# Patient Record
Sex: FEMALE | Race: BLACK OR AFRICAN AMERICAN | Employment: UNEMPLOYED | URBAN - METROPOLITAN AREA
[De-identification: names, ages, dates, MRNs, and addresses within clinical notes are randomized per-mention and may not be internally consistent; named-entity substitution may affect disease eponyms.]

---

## 2018-04-19 ENCOUNTER — OFFICE VISIT (OUTPATIENT)
Dept: FAMILY MEDICINE CLINIC | Facility: CLINIC | Age: 38
End: 2018-04-19

## 2018-04-19 VITALS
HEART RATE: 95 BPM | WEIGHT: 147.2 LBS | SYSTOLIC BLOOD PRESSURE: 129 MMHG | TEMPERATURE: 98.5 F | HEIGHT: 67 IN | OXYGEN SATURATION: 97 % | DIASTOLIC BLOOD PRESSURE: 89 MMHG | BODY MASS INDEX: 23.1 KG/M2 | RESPIRATION RATE: 17 BRPM

## 2018-04-19 DIAGNOSIS — H61.21 IMPACTED CERUMEN OF RIGHT EAR: ICD-10-CM

## 2018-04-19 DIAGNOSIS — R82.90 URINE FINDINGS ABNORMAL: ICD-10-CM

## 2018-04-19 DIAGNOSIS — R10.84 GENERALIZED ABDOMINAL PAIN: ICD-10-CM

## 2018-04-19 DIAGNOSIS — R10.84 GENERALIZED ABDOMINAL PAIN: Primary | ICD-10-CM

## 2018-04-19 DIAGNOSIS — F50.89 PICA IN ADULTS: ICD-10-CM

## 2018-04-19 DIAGNOSIS — Z87.19 HISTORY OF CONSTIPATION: ICD-10-CM

## 2018-04-19 LAB
BILIRUB UR QL STRIP: NEGATIVE
GLUCOSE UR-MCNC: NEGATIVE MG/DL
KETONES P FAST UR STRIP-MCNC: NEGATIVE MG/DL
PH UR STRIP: 8.5 [PH] (ref 4.6–8)
PROT UR QL STRIP: ABNORMAL
SP GR UR STRIP: 1.01 (ref 1–1.03)
UA UROBILINOGEN AMB POC: ABNORMAL (ref 0.2–1)
URINALYSIS CLARITY POC: CLEAR
URINALYSIS COLOR POC: YELLOW
URINE BLOOD POC: NEGATIVE
URINE LEUKOCYTES POC: ABNORMAL
URINE NITRITES POC: NEGATIVE

## 2018-04-19 RX ORDER — DOCUSATE SODIUM 100 MG/1
100 CAPSULE, LIQUID FILLED ORAL 2 TIMES DAILY
Qty: 60 CAP | Refills: 2 | Status: SHIPPED | OUTPATIENT
Start: 2018-04-19 | End: 2018-07-18

## 2018-04-19 NOTE — PATIENT INSTRUCTIONS
Back Stretches: Exercises  Your Care Instructions  Here are some examples of exercises for stretching your back. Start each exercise slowly. Ease off the exercise if you start to have pain. Your doctor or physical therapist will tell you when you can start these exercises and which ones will work best for you. How to do the exercises  Overhead stretch    1. Stand comfortably with your feet shoulder-width apart. 2. Looking straight ahead, raise both arms over your head and reach toward the ceiling. Do not allow your head to tilt back. 3. Hold for 15 to 30 seconds, then lower your arms to your sides. 4. Repeat 2 to 4 times. Side stretch    1. Stand comfortably with your feet shoulder-width apart. 2. Raise one arm over your head, and then lean to the other side. 3. Slide your hand down your leg as you let the weight of your arm gently stretch your side muscles. Hold for 15 to 30 seconds. 4. Repeat 2 to 4 times on each side. Press-up    1. Lie on your stomach, supporting your body with your forearms. 2. Press your elbows down into the floor to raise your upper back. As you do this, relax your stomach muscles and allow your back to arch without using your back muscles. As your press up, do not let your hips or pelvis come off the floor. 3. Hold for 15 to 30 seconds, then relax. 4. Repeat 2 to 4 times. Relax and rest    1. Lie on your back with a rolled towel under your neck and a pillow under your knees. Extend your arms comfortably to your sides. 2. Relax and breathe normally. 3. Remain in this position for about 10 minutes. 4. If you can, do this 2 or 3 times each day. Follow-up care is a key part of your treatment and safety. Be sure to make and go to all appointments, and call your doctor if you are having problems. It's also a good idea to know your test results and keep a list of the medicines you take. Where can you learn more? Go to http://sunil-karla.info/.   Enter A413 in the search box to learn more about \"Back Stretches: Exercises. \"  Current as of: March 21, 2017  Content Version: 11.4  © 1622-0240 [employer]. Care instructions adapted under license by [employer] (which disclaims liability or warranty for this information). If you have questions about a medical condition or this instruction, always ask your healthcare professional. [address] any warranty or liability for your use of this information. Abdominal Pain: Care Instructions  Your Care Instructions    Abdominal pain has many possible causes. Some aren't serious and get better on their own in a few days. Others need more testing and treatment. If your pain continues or gets worse, you need to be rechecked and may need more tests to find out what is wrong. You may need surgery to correct the problem. Don't ignore new symptoms, such as fever, nausea and vomiting, urination problems, pain that gets worse, and dizziness. These may be signs of a more serious problem. Your doctor may have recommended a follow-up visit in the next 8 to 12 hours. If you are not getting better, you may need more tests or treatment. The doctor has checked you carefully, but problems can develop later. If you notice any problems or new symptoms, get medical treatment right away. Follow-up care is a key part of your treatment and safety. Be sure to make and go to all appointments, and call your doctor if you are having problems. It's also a good idea to know your test results and keep a list of the medicines you take. How can you care for yourself at home? · Rest until you feel better. · To prevent dehydration, drink plenty of fluids, enough so that your urine is light yellow or clear like water. Choose water and other caffeine-free clear liquids until you feel better.  If you have kidney, heart, or liver disease and have to limit fluids, talk with your doctor before you increase the amount of fluids you drink. · If your stomach is upset, eat mild foods, such as rice, dry toast or crackers, bananas, and applesauce. Try eating several small meals instead of two or three large ones. · Wait until 48 hours after all symptoms have gone away before you have spicy foods, alcohol, and drinks that contain caffeine. · Do not eat foods that are high in fat. · Avoid anti-inflammatory medicines such as aspirin, ibuprofen (Advil, Motrin), and naproxen (Aleve). These can cause stomach upset. Talk to your doctor if you take daily aspirin for another health problem. When should you call for help? Call 911 anytime you think you may need emergency care. For example, call if:  ? · You passed out (lost consciousness). ? · You pass maroon or very bloody stools. ? · You vomit blood or what looks like coffee grounds. ? · You have new, severe belly pain. ?Call your doctor now or seek immediate medical care if:  ? · Your pain gets worse, especially if it becomes focused in one area of your belly. ? · You have a new or higher fever. ? · Your stools are black and look like tar, or they have streaks of blood. ? · You have unexpected vaginal bleeding. ? · You have symptoms of a urinary tract infection. These may include:  ¨ Pain when you urinate. ¨ Urinating more often than usual.  ¨ Blood in your urine. ? · You are dizzy or lightheaded, or you feel like you may faint. ? Watch closely for changes in your health, and be sure to contact your doctor if:  ? · You are not getting better after 1 day (24 hours). Where can you learn more? Go to http://sunil-karla.info/. Enter Q629 in the search box to learn more about \"Abdominal Pain: Care Instructions. \"  Current as of: March 20, 2017  Content Version: 11.4  © 6497-9799 [employer]. Care instructions adapted under license by [employer] (which disclaims liability or warranty for this information).  If you have questions about a medical condition or this instruction, always ask your healthcare professional. [address] any warranty or liability for your use of this information. Earwax Blockage: Care Instructions  Your Care Instructions    Earwax is a natural substance that protects the ear canal. Normally, earwax drains from the ears and does not cause problems. Sometimes earwax builds up and hardens. Earwax blockage (also called cerumen impaction) can cause some loss of hearing and pain. When wax is tightly packed, you will need to have your doctor remove it. Follow-up care is a key part of your treatment and safety. Be sure to make and go to all appointments, and call your doctor if you are having problems. It's also a good idea to know your test results and keep a list of the medicines you take. How can you care for yourself at home? · Do not try to remove earwax with cotton swabs, fingers, or other objects. This can make the blockage worse and damage the eardrum. · If your doctor recommends that you try to remove earwax at home:  ¨ Soften and loosen the earwax with warm mineral oil. You also can try hydrogen peroxide mixed with an equal amount of room temperature water. Place 2 drops of the fluid, warmed to body temperature, in the ear two times a day for up to 5 days. ¨ Once the wax is loose and soft, all that is usually needed to remove it from the ear canal is a gentle, warm shower. Direct the water into the ear, then tip your head to let the earwax drain out. Dry your ear thoroughly with a hair dryer set on low. Hold the dryer several inches from your ear. ¨ If the warm mineral oil and shower do not work, use an over-the-counter wax softener followed by gentle flushing with an ear syringe each night for a week or two. Make sure the flushing solution is body temperature. Cool or hot fluids in the ear can cause dizziness. When should you call for help?   Call your doctor now or seek immediate medical care if:  ? · Pus or blood drains from your ear. ? · Your ears are ringing or feel full. ? · You have a loss of hearing. ? Watch closely for changes in your health, and be sure to contact your doctor if:  ? · You have pain or reduced hearing after 1 week of home treatment. ? · You have any new symptoms, such as nausea or balance problems. Where can you learn more? Go to http://sunil-karla.info/. Enter U231 in the search box to learn more about \"Earwax Blockage: Care Instructions. \"  Current as of: March 20, 2017  Content Version: 11.4  © 6577-4527 [employer]. Care instructions adapted under license by [employer] (which disclaims liability or warranty for this information). If you have questions about a medical condition or this instruction, always ask your healthcare professional. [place] [address] any warranty or liability for your use of this information. Constipation: Care Instructions  Your Care Instructions    Constipation means that you have a hard time passing stools (bowel movements). People pass stools from 3 times a day to once every 3 days. What is normal for you may be different. Constipation may occur with pain in the rectum and cramping. The pain may get worse when you try to pass stools. Sometimes there are small amounts of bright red blood on toilet paper or the surface of stools. This is because of enlarged veins near the rectum (hemorrhoids). A few changes in your diet and lifestyle may help you avoid ongoing constipation. Your doctor may also prescribe medicine to help loosen your stool. Some medicines can cause constipation. These include pain medicines and antidepressants. Tell your doctor about all the medicines you take. Your doctor may want to make a medicine change to ease your symptoms. Follow-up care is a key part of your treatment and safety.  Be sure to make and go to all appointments, and call your doctor if you are having problems. It's also a good idea to know your test results and keep a list of the medicines you take. How can you care for yourself at home? · Drink plenty of fluids, enough so that your urine is light yellow or clear like water. If you have kidney, heart, or liver disease and have to limit fluids, talk with your doctor before you increase the amount of fluids you drink. · Include high-fiber foods in your diet each day. These include fruits, vegetables, beans, and whole grains. · Get at least 30 minutes of exercise on most days of the week. Walking is a good choice. You also may want to do other activities, such as running, swimming, cycling, or playing tennis or team sports. · Take a fiber supplement, such as Citrucel or Metamucil, every day. Read and follow all instructions on the label. · Schedule time each day for a bowel movement. A daily routine may help. Take your time having your bowel movement. · Support your feet with a small step stool when you sit on the toilet. This helps flex your hips and places your pelvis in a squatting position. · Your doctor may recommend an over-the-counter laxative to relieve your constipation. Examples are Milk of Magnesia and MiraLax. Read and follow all instructions on the label. Do not use laxatives on a long-term basis. When should you call for help? Call your doctor now or seek immediate medical care if:  ? · You have new or worse belly pain. ? · You have new or worse nausea or vomiting. ? · You have blood in your stools. ? Watch closely for changes in your health, and be sure to contact your doctor if:  ? · Your constipation is getting worse. ? · You do not get better as expected. Where can you learn more? Go to http://sunil-karla.info/. Enter [phone] in the search box to learn more about \"Constipation: Care Instructions. \"  Current as of: March 20, 2017  Content Version: 11.4  © 9055-4400 [employer]. Care instructions adapted under license by [employer] (which disclaims liability or warranty for this information). If you have questions about a medical condition or this instruction, always ask your healthcare professional. [address] any warranty or liability for your use of this information.

## 2018-04-19 NOTE — PROGRESS NOTES
Today's Date:  2018   Patient's Name: Sharif Ortega   Patient's :  1980     History:     Chief Complaint   Patient presents with    Abdominal Pain       Sharif Ortega is a 42 y.o. female presenting for an acute visit for abdominal pain. Last saw PCP  3 years ago. Pt sees speciallists. Endocrinology for thyroid disorder  She sees Gyn at the health clinic. Patient reports that the pain is located in the epigastrium, in the periumbilical area, in the LLQ and in the lower abdomen. The pain is described as sharp and pulling sensation horizontally Onset was 4 month ago. Symptoms have been gradually worsening since. Aggravating factors: movement. Alleviating factors: movement. Associated symptoms: belching, constipation and flatus. Previous procedures: none   The patient denies nausea and vomiting. Patient reports she craves substances other than food and she eats starch. Past Medical History:   Diagnosis Date    Asthma     Graves disease     Hypertension      Past Surgical History:   Procedure Laterality Date    HX TUBAL LIGATION        reports that she has been smoking. She has never used smokeless tobacco. She reports that she drinks about 2.4 oz of alcohol per week  She reports that she does not use illicit drugs. Family History   Problem Relation Age of Onset    Hypertension Maternal Grandmother      No Known Allergies    Problem List:      Patient Active Problem List   Diagnosis Code    Asthma J45.909    Hypertension I10    Arthritis M19.90       Medications:     Current Outpatient Prescriptions   Medication Sig    ARMOUR THYROID 120 mg tab     FLUoxetine (PROZAC) 40 mg capsule Take 40 mg by mouth daily. Indications: ANXIETY WITH DEPRESSION    ondansetron (ZOFRAN ODT) 4 mg disintegrating tablet Take 1 Tab by mouth every eight (8) hours as needed for Nausea.  ibuprofen (MOTRIN) 800 mg tablet Take 1 Tab by mouth every eight (8) hours as needed for Pain.     PROPRANOLOL HCL (PROPRANOLOL PO) Take  by mouth. No current facility-administered medications for this visit.         Review of Systems:   (Positives in bold  General:  fevers, chills, generalized weakness, fatigue, malaise, weight change, night sweats, decreased appetite  Neurologic: dizziness, lightheadedness, headaches, loss of consciousness, numbness, tingling, focal weakness, speech change,confusion, memory loss, gait or balance difficulty     Eyes:  vision changes, double vision, pain with eye movement, photophobia, excessive tearing, dry eyes, redness, discharge  Ears:  change in hearing, ear pain, ear discharge, ear ringing  Nose:  nosebleeds, sneezing, runny nose, nasal congestion  Mouth/Throat: sore throat, hoarse voice, difficulty swallowing  Neck:  pain, stiffness  Respiratory: dyspnea at rest, dyspnea on exertion, wheezing, cough, sputum  production, pain with deep breaths/inspiration, hemoptysis  Cardiovasc:   chest pain, palpitations, orthopnea, PND, pedal edema  Gastrointest: nausea, vomiting, abdominal pain, constipation, diarrhea, heart burn, bitter taste in back of throat, bloody stools, tarry black stools    Urinary: dysuria, hematuria, urinary frequency, nocturia, malodorous urine,  difficulty initiating flow, slow urine stream  Genital (F): Vaginal discharge, ulceration, rashes, itching, abnormal bleeding  Musculoskel: joint pain, joint stiffness, joint swelling, trauma, back pain, neck pain, decreased range of motion, focal muscle pain, diffuse myalgias   Psychiatric: abnormal mood swings, insomnia, anxiety, depression, hallucinations, suicidal ideation, homicidal ideation  Endocrine: polydipsia, polyuria, polyphagia, cold intolerance, heat intolerance  Hematologic:  easy bruising, easy bleeding  Dermatologic: Itching, rashes,     Physical Assessment:   VS:    Visit Vitals    /89 (BP 1 Location: Right arm, BP Patient Position: Sitting)    Pulse 95    Temp 98.5 °F (36.9 °C) (Oral)    Resp 17     5' 7\" (1.702 m)    Wt 147 lb 3.2 oz (66.8 kg)    LMP 04/12/2018    SpO2 97%    BMI 23.05 kg/m2       General:  Well-groomed, well-nourished, in no distress, pleasant, alert, appropriate and conversant. Eyes:   PERRL, EOMI. conjunctivae/corneas clear. Ears:   Left ear canals clear;  Right canal with cerumen impaction. tympanic membranes without erythema or exudate  Mouth: MMM, good dentition, oropharynx without exudate, petechiae or ulcers  Neck: Neck supple, no swelling, mass or tenderness or thyromegaly; trachea midline + bilateral submandibular lymph nodes    Cardiovasc:  No JVD. RRR,  no murmur, rubs or gallops. Pulmonary:   Normal respiratory effort. Lungs clear bilaterally, good air movement, no wheezing, rales or rhonchi. Abdomen:   Abdomen soft, normal bowel sounds. No masses,  rebound/rigidity or CVA tenderness. + left pelvic tenderness to palpation. No hepatosplenomegaly. Extremities:  No redness, deformity, edema, tenderness on palpation,  LEs warm and well-perfused. Neuro:           Gait normal. Reflexes and motor strength normal and symmetric. Cranial  nerves II-XII and sensation grossly intact. Skin:    No rashe or jaundice  MSK:   Normal full range of motion of joints, 5/5 muscle strength throughout. Psych:  Alert and oriented, no focal deficits. No facial asymmetry noted.     No pressured speech or abnormal thought content    Pertinent diagnostic procedures include:  Recent Results (from the past 24 hour(s))   AMB POC URINALYSIS DIP STICK MANUAL W/O MICRO    Collection Time: 04/19/18  2:24 PM   Result Value Ref Range    Color (UA POC) Yellow     Clarity (UA POC) Clear     Glucose (UA POC) Negative Negative    Bilirubin (UA POC) Negative Negative    Ketones (UA POC) Negative Negative    Specific gravity (UA POC) 1.015 1.001 - 1.035    Blood (UA POC) Negative Negative    pH (UA POC) 8.5 (A) 4.6 - 8.0    Protein (UA POC) Trace Negative    Urobilinogen (UA POC) 0.2 mg/dL 0.2 - 1 Nitrites (UA POC) Negative Negative    Leukocyte esterase (UA POC) Trace Negative        Assessment/Plan & Orders:         ICD-10-CM ICD-9-CM    1. Generalized abdominal pain R10.84 789.07 CBC WITH AUTOMATED DIFF      HEMOGLOBIN A1C W/O EAG      TSH 3RD GENERATION      T4, FREE      METABOLIC PANEL, COMPREHENSIVE      AMB POC URINALYSIS DIP STICK MANUAL W/O MICRO      XR ABD (KUB)      REFERRAL TO GASTROENTEROLOGY      HCG QL SERUM      CANCELED: AMB POC URINE PREGNANCY TEST, VISUAL COLOR COMPARISON   2. Urine findings abnormal R82.90 791.9 CULTURE, URINE   3. Impacted cerumen of right ear H61.21 380.4 carbamide peroxide (DEBROX) 6.5 % otic solution   4. History of constipation Z87.19 V12.79 docusate sodium (COLACE) 100 mg capsule   5. Pica in adults F50.89 307.52        Follow up with your primary care provider in one week. Plan was reviewed with patient, education material was explained and given. Patient verbalized understanding. Healthy lifestyle has been encouraged including avoidance of tobacco, limiting or avoiding alcohol intake, heart healthy diet which is low in cholesterol and saturated fat and contains fresh fruits, vegetables and whole grains and fiber, regular exercise with goals of 20-30 minutes 3-5 days weekly and maintaining an optimal BMI. I have discussed the diagnosis with the patient and the intended plan as seen in the above orders. The patient has received an after-visit summary along with patient information handout. I have discussed medication side effects and warnings with the patient as well. Pt verbalized understanding.     Juan Carlos Miller NP-C  [facility]  [address] [place], [address]  Phone [phone]  Fax [phone]

## 2018-04-19 NOTE — PROGRESS NOTES
Stacy Murrieta is a 42 y.o.  female presents today for office visit for follow up apt. Pt would also like to discuss abdominal pain . Pt is not fasting. Pt is in Room # 9      1. Have you been to the ER, urgent care clinic since your last visit? Hospitalized since your last visit? No    2. Have you seen or consulted any other health care providers outside of the [facility] since your last visit? Include any pap smears or colon screening. endocrinologist    Upcoming Appts  none    Health Maintenance was not reviewed due to acute care visit.     VORB:   Orders Placed This Encounter    XR ABD (KUB)    CBC WITH AUTOMATED DIFF    HEMOGLOBIN A1C W/O EAG    TSH 3RD GENERATION    T4, FREE    METABOLIC PANEL, COMPREHENSIVE    REFERRAL TO GASTROENTEROLOGY    AMB POC URINALYSIS DIP STICK MANUAL W/O MICRO    AMB POC URINE PREGNANCY TEST, VISUAL COLOR COMPARISON   Braden Nelson LPN

## 2018-04-19 NOTE — MR AVS SNAPSHOT
[address] [address] 
[phone] Patient: Sarkis Perez MRN: [id number] :[id number] Visit Information Date & Time Provider Department Dept. Phone Encounter #  
 2018  2:30 PM Clara De Leon  Damaris Harden [phone] [id number] Upcoming Health Maintenance Date Due Pneumococcal 19-64 Medium Risk (1 of 1 - PPSV23) 1997 DTaP/Tdap/Td series (1 - Tdap) 1999 Influenza Age 5 to Adult 2017 PAP AKA CERVICAL CYTOLOGY 2017 Allergies as of 2018  Review Complete On: 2018 By: Samuel Renteria LPN No Known Allergies Current Immunizations  Never Reviewed Name Date  
 TB Skin Test (PPD) Intradermal  Incomplete Not reviewed this visit You Were Diagnosed With   
  
 Codes Comments Generalized abdominal pain    -  Primary ICD-10-CM: R10.84 ICD-9-CM: 789.07 Urine findings abnormal     ICD-10-CM: R82.90 ICD-9-CM: 791.9 Impacted cerumen of right ear     ICD-10-CM: H61.21 ICD-9-CM: 380.4 History of constipation     ICD-10-CM: Z87.19 ICD-9-CM: V12.79 Vitals BP Pulse Temp Resp Height(growth percentile) Weight(growth percentile) 129/89 (BP 1 Location: Right arm, BP Patient Position: Sitting) 95 98.5 °F (36.9 °C) (Oral) 17 5' 7\" (1.702 m) 147 lb 3.2 oz (66.8 kg) LMP SpO2 BMI OB Status Smoking Status 2018 97% 23.05 kg/m2 Having regular periods Current Every Day Smoker BMI and BSA Data Body Mass Index Body Surface Area 23.05 kg/m 2 1.78 m 2 Preferred Pharmacy Pharmacy Name Phone [id number] [address] [facility], [address] [place] [phone] Your Updated Medication List  
  
   
This list is accurate as of 18  3:06 PM.  Always use your most recent med list.  
  
  
  
  
 ARMOUR THYROID 120 mg Tab Generic drug:  Thyroid (Pork) carbamide peroxide 6.5 % otic solution Commonly known as:  Jhony Jin Administer 5 Drops into each ear two (2) times a day. Indications: IMPACTED CERUMEN  
  
 docusate sodium 100 mg capsule Commonly known as:  [place] Pian Take 1 Cap by mouth two (2) times a day for 90 days. FLUoxetine 40 mg capsule Commonly known as:  PROzac Take 40 mg by mouth daily. Indications: ANXIETY WITH DEPRESSION  
  
 ibuprofen 800 mg tablet Commonly known as:  MOTRIN Take 1 Tab by mouth every eight (8) hours as needed for Pain. ondansetron 4 mg disintegrating tablet Commonly known as:  ZOFRAN ODT Take 1 Tab by mouth every eight (8) hours as needed for Nausea. PROPRANOLOL PO Take  by mouth. Prescriptions Sent to Pharmacy Refills  
 docusate sodium (COLACE) 100 mg capsule 2 Sig: Take 1 Cap by mouth two (2) times a day for 90 days. Class: Normal  
 Pharmacy: [address] [place] [address],[facility] Ph #: [phone] Route: Oral  
 carbamide peroxide (DEBROX) 6.5 % otic solution 0 Sig: Administer 5 Drops into each ear two (2) times a day. Indications: IMPACTED CERUMEN  
 Class: Normal  
 Pharmacy: [address] [place] [address], [address] [place] [address], 29 Ph #: [phone] Route: Both Ears We Performed the Following AMB POC URINALYSIS DIP STICK MANUAL W/O MICRO [09110 CPT(R)] AMB POC URINE PREGNANCY TEST, VISUAL COLOR COMPARISON [99265 CPT(R)] REFERRAL TO GASTROENTEROLOGY [NWG11 Custom] Comments:  
 Please evaluate patient for  Chronic mid abdominal pain. To-Do List   
 04/19/2018 Microbiology:  CULTURE, URINE   
  
 04/19/2018 Imaging:  XR ABD (KUB) Referral Information Referral ID Referred By Referred To  
  
 [id number] Chad Stephens Not Available Visits Status Start Date End Date 1 New Request 4/19/18 4/19/19  If your referral has a status of pending review or denied, additional information will be sent to support the outcome of this decision. Patient Instructions Back Stretches: Exercises Your Care Instructions Here are some examples of exercises for stretching your back. Start each exercise slowly. Ease off the exercise if you start to have pain. Your doctor or physical therapist will tell you when you can start these exercises and which ones will work best for you. How to do the exercises Overhead stretch 1. Stand comfortably with your feet shoulder-width apart. 2. Looking straight ahead, raise both arms over your head and reach toward the ceiling. Do not allow your head to tilt back. 3. Hold for 15 to 30 seconds, then lower your arms to your sides. 4. Repeat 2 to 4 times. Side stretch 1. Stand comfortably with your feet shoulder-width apart. 2. Raise one arm over your head, and then lean to the other side. 3. Slide your hand down your leg as you let the weight of your arm gently stretch your side muscles. Hold for 15 to 30 seconds. 4. Repeat 2 to 4 times on each side. Press-up 1. Lie on your stomach, supporting your body with your forearms. 2. Press your elbows down into the floor to raise your upper back. As you do this, relax your stomach muscles and allow your back to arch without using your back muscles. As your press up, do not let your hips or pelvis come off the floor. 3. Hold for 15 to 30 seconds, then relax. 4. Repeat 2 to 4 times. Relax and rest 
 
1. Lie on your back with a rolled towel under your neck and a pillow under your knees. Extend your arms comfortably to your sides. 2. Relax and breathe normally. 3. Remain in this position for about 10 minutes. 4. If you can, do this 2 or 3 times each day. Follow-up care is a key part of your treatment and safety. Be sure to make and go to all appointments, and call your doctor if you are having problems.  It's also a good idea to know your test results and keep a list of the medicines you take. Where can you learn more? Go to http://sunil-karla.info/. Enter O383 in the search box to learn more about \"Back Stretches: Exercises. \" Current as of: March 21, 2017 Content Version: 11.4 © 1027-6154 [employer]. Care instructions adapted under license by [employer] (which disclaims liability or warranty for this information). If you have questions about a medical condition or this instruction, always ask your healthcare professional. [place] [address] any warranty or liability for your use of this information. Abdominal Pain: Care Instructions Your Care Instructions Abdominal pain has many possible causes. Some aren't serious and get better on their own in a few days. Others need more testing and treatment. If your pain continues or gets worse, you need to be rechecked and may need more tests to find out what is wrong. You may need surgery to correct the problem. Don't ignore new symptoms, such as fever, nausea and vomiting, urination problems, pain that gets worse, and dizziness. These may be signs of a more serious problem. Your doctor may have recommended a follow-up visit in the next 8 to 12 hours. If you are not getting better, you may need more tests or treatment. The doctor has checked you carefully, but problems can develop later. If you notice any problems or new symptoms, get medical treatment right away. Follow-up care is a key part of your treatment and safety. Be sure to make and go to all appointments, and call your doctor if you are having problems. It's also a good idea to know your test results and keep a list of the medicines you take. How can you care for yourself at home? · Rest until you feel better. · To prevent dehydration, drink plenty of fluids, enough so that your urine is light yellow or clear like water.  Choose water and other caffeine-free clear liquids until you feel better. If you have kidney, heart, or liver disease and have to limit fluids, talk with your doctor before you increase the amount of fluids you drink. · If your stomach is upset, eat mild foods, such as rice, dry toast or crackers, bananas, and applesauce. Try eating several small meals instead of two or three large ones. · Wait until 48 hours after all symptoms have gone away before you have spicy foods, alcohol, and drinks that contain caffeine. · Do not eat foods that are high in fat. · Avoid anti-inflammatory medicines such as aspirin, ibuprofen (Advil, Motrin), and naproxen (Aleve). These can cause stomach upset. Talk to your doctor if you take daily aspirin for another health problem. When should you call for help? Call 911 anytime you think you may need emergency care. For example, call if: 
? · You passed out (lost consciousness). ? · You pass maroon or very bloody stools. ? · You vomit blood or what looks like coffee grounds. ? · You have new, severe belly pain. ?Call your doctor now or seek immediate medical care if: 
? · Your pain gets worse, especially if it becomes focused in one area of your belly. ? · You have a new or higher fever. ? · Your stools are black and look like tar, or they have streaks of blood. ? · You have unexpected vaginal bleeding. ? · You have symptoms of a urinary tract infection. These may include: 
¨ Pain when you urinate. ¨ Urinating more often than usual. 
¨ Blood in your urine. ? · You are dizzy or lightheaded, or you feel like you may faint. ? Watch closely for changes in your health, and be sure to contact your doctor if: 
? · You are not getting better after 1 day (24 hours). Where can you learn more? Go to http://sunil-karla.info/. Enter O104 in the search box to learn more about \"Abdominal Pain: Care Instructions. \" Current as of: March 20, 2017 Content Version: 11.4 © 3092-5726 [employer]. Care instructions adapted under license by [employer] (which disclaims liability or warranty for this information). If you have questions about a medical condition or this instruction, always ask your healthcare professional. [address] any warranty or liability for your use of this information. Introducing \[facility]\"" & HEALTH SERVICES! Dear Radha Espinosa: Thank you for requesting a WEALTH at work account. Our records indicate that you already have an active WEALTH at work account. You can access your account anytime at [email or web address] Did you know that you can access your hospital and ER discharge instructions at any time in WEALTH at work? You can also review all of your test results from your hospital stay or ER visit. Additional Information If you have questions, please visit the Frequently Asked Questions section of the WEALTH at work website at [email or web address] Remember, WEALTH at work is NOT to be used for urgent needs. For medical emergencies, dial 911. Now available from your iPhone and Android! Please provide this summary of care documentation to your next provider. Your primary care clinician is listed as Khalif Ochoa. If you have any questions after today's visit, please call [phone].

## 2018-04-20 ENCOUNTER — HOSPITAL ENCOUNTER (OUTPATIENT)
Dept: GENERAL RADIOLOGY | Age: 38
Discharge: HOME | End: 2018-04-20
Payer: MEDICAID

## 2018-04-20 DIAGNOSIS — R10.84 GENERALIZED ABDOMINAL PAIN: ICD-10-CM

## 2018-04-20 PROCEDURE — 74018 RADEX ABDOMEN 1 VIEW: CPT

## 2018-04-20 NOTE — PROGRESS NOTES
Your abdominal x-ray shows No evidence for bowel obstruction or ileus. Follow up with gastro for further workup.

## 2018-04-21 LAB
ALBUMIN SERPL-MCNC: 4.3 G/DL (ref 3.5–5.5)
ALBUMIN/GLOB SERPL: 1.3 {RATIO} (ref 1.2–2.2)
ALP SERPL-CCNC: 36 IU/L (ref 39–117)
ALT SERPL-CCNC: 13 IU/L (ref 0–32)
AST SERPL-CCNC: 26 IU/L (ref 0–40)
BACTERIA UR CULT: NO GROWTH
BASOPHILS # BLD AUTO: 0 X10E3/UL (ref 0–0.2)
BASOPHILS NFR BLD AUTO: 1 %
BILIRUB SERPL-MCNC: <0.2 MG/DL (ref 0–1.2)
BUN SERPL-MCNC: 20 MG/DL (ref 6–20)
BUN/CREAT SERPL: 19 (ref 9–23)
CALCIUM SERPL-MCNC: 8.9 MG/DL (ref 8.7–10.2)
CHLORIDE SERPL-SCNC: 102 MMOL/L (ref 96–106)
CO2 SERPL-SCNC: 22 MMOL/L (ref 18–29)
CREAT SERPL-MCNC: 1.07 MG/DL (ref 0.57–1)
EOSINOPHIL # BLD AUTO: 0 X10E3/UL (ref 0–0.4)
EOSINOPHIL NFR BLD AUTO: 1 %
ERYTHROCYTE [DISTWIDTH] IN BLOOD BY AUTOMATED COUNT: 17.6 % (ref 12.3–15.4)
GFR SERPLBLD CREATININE-BSD FMLA CKD-EPI: 66 ML/MIN/1.73
GFR SERPLBLD CREATININE-BSD FMLA CKD-EPI: 76 ML/MIN/1.73
GLOBULIN SER CALC-MCNC: 3.3 G/DL (ref 1.5–4.5)
GLUCOSE SERPL-MCNC: 77 MG/DL (ref 65–99)
HBA1C MFR BLD: 5.4 % (ref 4.8–5.6)
HCT VFR BLD AUTO: 26.3 % (ref 34–46.6)
HGB BLD-MCNC: 8.1 G/DL (ref 11.1–15.9)
IMM GRANULOCYTES # BLD: 0 X10E3/UL (ref 0–0.1)
IMM GRANULOCYTES NFR BLD: 0 %
LYMPHOCYTES # BLD AUTO: 1.5 X10E3/UL (ref 0.7–3.1)
LYMPHOCYTES NFR BLD AUTO: 36 %
MCH RBC QN AUTO: 26 PG (ref 26.6–33)
MCHC RBC AUTO-ENTMCNC: 30.8 G/DL (ref 31.5–35.7)
MCV RBC AUTO: 84 FL (ref 79–97)
MONOCYTES # BLD AUTO: 0.2 X10E3/UL (ref 0.1–0.9)
MONOCYTES NFR BLD AUTO: 5 %
NEUTROPHILS # BLD AUTO: 2.5 X10E3/UL (ref 1.4–7)
NEUTROPHILS NFR BLD AUTO: 57 %
PLATELET # BLD AUTO: 241 X10E3/UL (ref 150–379)
POTASSIUM SERPL-SCNC: 4.2 MMOL/L (ref 3.5–5.2)
PROT SERPL-MCNC: 7.6 G/DL (ref 6–8.5)
RBC # BLD AUTO: 3.12 X10E6/UL (ref 3.77–5.28)
SODIUM SERPL-SCNC: 137 MMOL/L (ref 134–144)
T4 FREE SERPL-MCNC: 0.24 NG/DL (ref 0.82–1.77)
TSH SERPL DL<=0.005 MIU/L-ACNC: 115.7 UIU/ML (ref 0.45–4.5)
WBC # BLD AUTO: 4.2 X10E3/UL (ref 3.4–10.8)

## 2018-04-23 ENCOUNTER — TELEPHONE (OUTPATIENT)
Dept: FAMILY MEDICINE CLINIC | Facility: CLINIC | Age: 38
End: 2018-04-23

## 2018-04-23 NOTE — TELEPHONE ENCOUNTER
Your abdominal x-ray shows No evidence for bowel obstruction or ileus. Follow up with gastro for further workup. Spoke with pt in regards to results. Pt acknowledges understanding and voices no concerns at this time.

## 2018-05-03 NOTE — PROGRESS NOTES
A1c inormal  Urine culture normal  Cbc shows anemia  Thyroid labs shows hypothyroidism- prescription sent to your pharmacy for thyroid hormone replacement  Levothyroxine . Please also come to the office for more labs. Radioactive uptake scan of thyroid also ordered.

## 2018-05-04 DIAGNOSIS — E03.9 HYPOTHYROIDISM, UNSPECIFIED TYPE: Primary | ICD-10-CM

## 2018-05-04 NOTE — TELEPHONE ENCOUNTER
A1c inormal   Urine culture normal   Cbc shows anemia   Thyroid labs shows hypothyroidism- prescription sent to your pharmacy for thyroid hormone replacement  Levothyroxine . Please also come to the office for more labs. Radioactive uptake scan of thyroid also ordered. Spoke with pt in regards to results. Pt acknowledges understanding and voices no concerns at this time.

## 2018-05-09 RX ORDER — LEVOTHYROXINE SODIUM 50 UG/1
50 TABLET ORAL
Qty: 30 TAB | Refills: 3 | Status: SHIPPED | OUTPATIENT
Start: 2018-05-09 | End: 2019-02-25

## 2018-05-21 NOTE — PROGRESS NOTES
Called patient to remind her of her thyroid uptake appointment tomorrow. (5/22) Patient informed me that she has been taking her synthroid with her last dose being this morning. Patient must be off synthroid for two weeks prior to thyroid uptake and scan. Informed patient of prep and contacted provider's office. Provider's office will be contacting patient to reschedule appointment and inform patient of necessary prep.

## 2018-05-22 ENCOUNTER — HOSPITAL ENCOUNTER (OUTPATIENT)
Dept: NUCLEAR MEDICINE | Age: 38
Discharge: HOME | End: 2018-05-22
Attending: NURSE PRACTITIONER

## 2019-02-25 ENCOUNTER — OFFICE VISIT (OUTPATIENT)
Dept: INTERNAL MEDICINE CLINIC | Age: 39
End: 2019-02-25

## 2019-02-25 VITALS
BODY MASS INDEX: 23.23 KG/M2 | HEIGHT: 67 IN | WEIGHT: 148 LBS | RESPIRATION RATE: 16 BRPM | HEART RATE: 72 BPM | DIASTOLIC BLOOD PRESSURE: 88 MMHG | OXYGEN SATURATION: 97 % | TEMPERATURE: 99 F | SYSTOLIC BLOOD PRESSURE: 127 MMHG

## 2019-02-25 DIAGNOSIS — E03.9 HYPOTHYROIDISM, UNSPECIFIED TYPE: Primary | ICD-10-CM

## 2019-02-25 DIAGNOSIS — Z86.2 HISTORY OF ANEMIA: ICD-10-CM

## 2019-02-25 DIAGNOSIS — F17.209 NICOTINE DEPENDENCE WITH NICOTINE-INDUCED DISORDER, UNSPECIFIED NICOTINE PRODUCT TYPE: ICD-10-CM

## 2019-02-25 RX ORDER — BUPROPION HYDROCHLORIDE 150 MG/1
TABLET, EXTENDED RELEASE ORAL
Qty: 180 TAB | Refills: 0 | Status: SHIPPED | OUTPATIENT
Start: 2019-02-25

## 2019-02-25 RX ORDER — NICOTINE 7MG/24HR
1 PATCH, TRANSDERMAL 24 HOURS TRANSDERMAL EVERY 24 HOURS
Qty: 45 PATCH | Refills: 0 | Status: SHIPPED | OUTPATIENT
Start: 2019-02-25 | End: 2019-04-11

## 2019-02-25 RX ORDER — LEVOTHYROXINE SODIUM 112 UG/1
112 TABLET ORAL
Qty: 30 TAB | Refills: 0 | Status: SHIPPED | OUTPATIENT
Start: 2019-02-25 | End: 2019-04-06 | Stop reason: SDUPTHER

## 2019-02-25 NOTE — PROGRESS NOTES
FAMILY MEDICINE CLINIC NOTE 
 
S: The patient presents for establishment of care. She has a medical history significant for hypothyroidism. She has been adherent with levothyroxine 112 mcg daily. She denies any adverse side effects. She feels like she is loosing weight and is always hungry and fatigued. She denies angina, dyspnea, palpitations or edema. Last pap smear was last year and normal.  
 
She has not had a mammogram before, no family history of breast cancer. She smokes about 1 pack per week. She is amenable to cessation. History of anemia Current Outpatient Medications on File Prior to Visit Medication Sig Dispense Refill  carbamide peroxide (DEBROX) 6.5 % otic solution Administer 5 Drops into each ear two (2) times a day. Indications: IMPACTED CERUMEN 7.5 mL 0  
 FLUoxetine (PROZAC) 40 mg capsule Take 40 mg by mouth daily. Indications: ANXIETY WITH DEPRESSION  ondansetron (ZOFRAN ODT) 4 mg disintegrating tablet Take 1 Tab by mouth every eight (8) hours as needed for Nausea. 12 Tab 0  ibuprofen (MOTRIN) 800 mg tablet Take 1 Tab by mouth every eight (8) hours as needed for Pain. 50 Tab 1  
 PROPRANOLOL HCL (PROPRANOLOL PO) Take  by mouth. No current facility-administered medications on file prior to visit. Past Medical History:  
Diagnosis Date  Asthma  Graves disease  Hypertension Social History Socioeconomic History  Marital status: SINGLE Spouse name: Not on file  Number of children: Not on file  Years of education: Not on file  Highest education level: Not on file Social Needs  Financial resource strain: Not on file  Food insecurity - worry: Not on file  Food insecurity - inability: Not on file  Transportation needs - medical: Not on file  Transportation needs - non-medical: Not on file Occupational History  Not on file Tobacco Use  Smoking status: Current Every Day Smoker  Smokeless tobacco: Never Used Substance and Sexual Activity  Alcohol use: Yes Alcohol/week: 2.4 oz Types: 4 Glasses of wine per week  Drug use: No  
 Sexual activity: Yes  
  Partners: Male Birth control/protection: None Other Topics Concern  Not on file Social History Narrative  Not on file Family History Problem Relation Age of Onset  Hypertension Maternal Grandmother O: 
Visit Vitals /88 (BP 1 Location: Right arm, BP Patient Position: Sitting) Pulse 72 Temp 99 °F (37.2 °C) (Oral) Resp 16 Ht 5' 7\" (1.702 m) Wt 148 lb (67.1 kg) SpO2 97% BMI 23.18 kg/m² NAD, comfortable No thyromegaly No LAD 
RRR, no murmurs CTABL, no wheezing/ronchi/rales 
abd soft ND NT normoactive BS No edema 34 y.o. female ICD-10-CM ICD-9-CM 1. Hypothyroidism, unspecified type E03.9 244.9 TSH AND FREE T4  
   METABOLIC PANEL, COMPREHENSIVE  
   levothyroxine (SYNTHROID) 112 mcg tablet 2. Nicotine dependence with nicotine-induced disorder, unspecified nicotine product type F17.209 292.9 nicotine (NICODERM CQ) 7 mg/24 hr  
  305.1 buPROPion SR (WELLBUTRIN SR) 150 mg SR tablet 3.  History of anemia Z86.2 V12.3 CBC WITH AUTOMATED DIFF  
   IRON PROFILE

## 2019-02-25 NOTE — PROGRESS NOTES
ROOM # 10 
 
Paula Lopez presents today for Chief Complaint Patient presents with Claudio.Kobs Establish Care  Thyroid Problem Paula Lopez preferred language for health care discussion is english/other. Is someone accompanying this pt? Yes son Is the patient using any DME equipment during [address]? no 
 
Depression Screening: 
3 most recent [facility] Screens 2/25/2019 8/10/2015 7/29/2014 Little interest or pleasure in doing things Not at all Not at all Not at all Feeling down, depressed, irritable, or hopeless Not at all Not at all Not at all Total Score PHQ 2 0 0 0 Learning Assessment: 
Learning Assessment 9/17/2015 PRIMARY LEARNER Patient PRIMARY LANGUAGE ENGLISH  
LEARNER PREFERENCE PRIMARY LISTENING  
ANSWERED BY patient RELATIONSHIP SELF Abuse Screening: No flowsheet data found. Fall Risk No flowsheet data found. Health Maintenance reviewed and discussed per provider. Yes Paula Lopez is due for Health Maintenance Due Topic Date Due  Pneumococcal 19-64 Medium Risk (1 of 1 - PPSV23) 11/24/1997  
 DTaP/Tdap/Td series (1 - Tdap) 11/24/1999  PAP AKA CERVICAL CYTOLOGY  08/20/2017  Influenza Age 5 to Adult  08/01/2018 Please order/place referral if appropriate. Advance Directive: 1. Do you have an advance directive in place? Patient Reply: no 
 
2. If not, would you like material regarding how to put one in place? Patient Reply: no 
 
Coordination of Care: 1. Have you been to the ER, urgent care clinic since your last visit? Hospitalized since your last visit? yes 2. Have you seen or consulted any other health care providers outside of the [address] [place] [address] since your last visit? Include any pap smears or colon screening.  no

## 2019-02-26 ENCOUNTER — DOCUMENTATION ONLY (OUTPATIENT)
Dept: INTERNAL MEDICINE CLINIC | Age: 39
End: 2019-02-26

## 2019-02-26 LAB
ALBUMIN SERPL-MCNC: 4.6 G/DL (ref 3.5–5.5)
ALBUMIN/GLOB SERPL: 1.4 {RATIO} (ref 1.2–2.2)
ALP SERPL-CCNC: 51 IU/L (ref 39–117)
ALT SERPL-CCNC: 16 IU/L (ref 0–32)
AST SERPL-CCNC: 22 IU/L (ref 0–40)
BASOPHILS # BLD AUTO: 0 X10E3/UL (ref 0–0.2)
BASOPHILS NFR BLD AUTO: 1 %
BILIRUB SERPL-MCNC: <0.2 MG/DL (ref 0–1.2)
BUN SERPL-MCNC: 20 MG/DL (ref 6–24)
BUN/CREAT SERPL: 21 (ref 9–23)
CALCIUM SERPL-MCNC: 9.4 MG/DL (ref 8.7–10.2)
CHLORIDE SERPL-SCNC: 103 MMOL/L (ref 96–106)
CO2 SERPL-SCNC: 24 MMOL/L (ref 20–29)
CREAT SERPL-MCNC: 0.97 MG/DL (ref 0.57–1)
EOSINOPHIL # BLD AUTO: 0 X10E3/UL (ref 0–0.4)
EOSINOPHIL NFR BLD AUTO: 1 %
ERYTHROCYTE [DISTWIDTH] IN BLOOD BY AUTOMATED COUNT: 21.3 % (ref 12.3–15.4)
GLOBULIN SER CALC-MCNC: 3.4 G/DL (ref 1.5–4.5)
GLUCOSE SERPL-MCNC: 79 MG/DL (ref 65–99)
HCT VFR BLD AUTO: 29.2 % (ref 34–46.6)
HGB BLD-MCNC: 9 G/DL (ref 11.1–15.9)
IMM GRANULOCYTES # BLD AUTO: 0 X10E3/UL (ref 0–0.1)
IMM GRANULOCYTES NFR BLD AUTO: 0 %
IRON SATN MFR SERPL: 4 % (ref 15–55)
IRON SERPL-MCNC: 15 UG/DL (ref 27–159)
LYMPHOCYTES # BLD AUTO: 1.3 X10E3/UL (ref 0.7–3.1)
LYMPHOCYTES NFR BLD AUTO: 32 %
MCH RBC QN AUTO: 24.3 PG (ref 26.6–33)
MCHC RBC AUTO-ENTMCNC: 30.8 G/DL (ref 31.5–35.7)
MCV RBC AUTO: 79 FL (ref 79–97)
MONOCYTES # BLD AUTO: 0.4 X10E3/UL (ref 0.1–0.9)
MONOCYTES NFR BLD AUTO: 10 %
NEUTROPHILS # BLD AUTO: 2.3 X10E3/UL (ref 1.4–7)
NEUTROPHILS NFR BLD AUTO: 56 %
PLATELET # BLD AUTO: 237 X10E3/UL (ref 150–379)
POTASSIUM SERPL-SCNC: 3.7 MMOL/L (ref 3.5–5.2)
PROT SERPL-MCNC: 8 G/DL (ref 6–8.5)
RBC # BLD AUTO: 3.71 X10E6/UL (ref 3.77–5.28)
SODIUM SERPL-SCNC: 140 MMOL/L (ref 134–144)
T4 FREE SERPL-MCNC: 1.18 NG/DL (ref 0.82–1.77)
TIBC SERPL-MCNC: 424 UG/DL (ref 250–450)
TSH SERPL DL<=0.005 MIU/L-ACNC: 33.78 UIU/ML (ref 0.45–4.5)
UIBC SERPL-MCNC: 409 UG/DL (ref 131–425)
WBC # BLD AUTO: 4.1 X10E3/UL (ref 3.4–10.8)

## 2019-02-26 RX ORDER — LEVOTHYROXINE SODIUM 125 UG/1
125 TABLET ORAL
Qty: 30 TAB | Refills: 1 | OUTPATIENT
Start: 2019-02-26

## 2019-02-26 RX ORDER — FERROUS SULFATE 325(65) MG
325 TABLET, DELAYED RELEASE (ENTERIC COATED) ORAL
Qty: 90 TAB | Refills: 2 | OUTPATIENT
Start: 2019-02-26

## 2019-02-26 NOTE — PROGRESS NOTES
Pt contacted at home number. 2 pt identifiers confirmed. Pt informed of below. Pt verbalized understanding. No other questions at this time. Leann Buitrago MD sent to  [facility] Nurse Pool 1 hour ago (11:56 AM)     Please call this patient. Inform them that her l;abs demonstrated low thyroid function and iron deficiency anemia. I am increasing the dose of her levothyroxine from 112 mch to 125 mcg daily and we can recheck this in 6 weeks. I have started her on iron three times daily. I have sent both of these prescriptions to her pharmacy.      Thank you     Dr. James Alcazar (Routing comment)

## 2019-02-26 NOTE — PROGRESS NOTES
Lab results reviewed. Iron low, TSH high. Will prescribe ferrous sulfate 325 mg TID and increase dose of levothyroxine from 112 mch to 125 mcg daily. Will forward to nursing pool to contact the patient with results.

## 2019-05-21 ENCOUNTER — TELEPHONE (OUTPATIENT)
Dept: INTERNAL MEDICINE CLINIC | Age: 39
End: 2019-05-21

## 2019-05-21 ENCOUNTER — DOCUMENTATION ONLY (OUTPATIENT)
Dept: INTERNAL MEDICINE CLINIC | Age: 39
End: 2019-05-21

## 2019-05-21 NOTE — TELEPHONE ENCOUNTER
Please return call to patient she was a patient of Dr Anila Rivas and she has questions regarding physiotherapy  Please return call to patient when available

## 2019-05-25 NOTE — TELEPHONE ENCOUNTER
Spoke with patient 2 identifiers confirmed. Patient is needing appt for new pcp. Appt was made for 6/12/19 with Dr. Sera Monroy.

## 2019-12-18 ENCOUNTER — DOCUMENTATION ONLY (OUTPATIENT)
Dept: FAMILY MEDICINE CLINIC | Age: 39
End: 2019-12-18

## 2019-12-18 NOTE — PROGRESS NOTES
Patient came in 12/18/19 at 12:56 for new patient appt. Patients appt is not until 12/23/19 at 1:00 with arrival time at 12:30. I informed patient of wrong appointment date and time, she was very rude stating I was in the wrong. This is the 5th time someone has moved her appointment without her knowing. Patient only has one scheduled appt with no reschedules. Apologized to patient for inconvience and she walked out rolling eyes. Documentation only.

## 2021-12-07 ENCOUNTER — OFFICE VISIT (OUTPATIENT)
Dept: URBAN - METROPOLITAN AREA CLINIC 109 | Facility: CLINIC | Age: 41
End: 2021-12-07

## 2022-03-01 ENCOUNTER — OFFICE VISIT (OUTPATIENT)
Dept: URBAN - METROPOLITAN AREA CLINIC 17 | Facility: CLINIC | Age: 42
End: 2022-03-01